# Patient Record
Sex: MALE | Race: WHITE | NOT HISPANIC OR LATINO | Employment: UNEMPLOYED | URBAN - METROPOLITAN AREA
[De-identification: names, ages, dates, MRNs, and addresses within clinical notes are randomized per-mention and may not be internally consistent; named-entity substitution may affect disease eponyms.]

---

## 2024-04-05 ENCOUNTER — HOSPITAL ENCOUNTER (EMERGENCY)
Facility: HOSPITAL | Age: 54
Discharge: HOME | End: 2024-04-06
Attending: EMERGENCY MEDICINE
Payer: MEDICAID

## 2024-04-05 DIAGNOSIS — M72.2 PLANTAR FASCIITIS, RIGHT: Primary | ICD-10-CM

## 2024-04-05 DIAGNOSIS — S99.929A FOOT INJURY: ICD-10-CM

## 2024-04-05 PROCEDURE — 99284 EMERGENCY DEPT VISIT MOD MDM: CPT | Mod: 25

## 2024-04-05 NOTE — Clinical Note
"Ovidio Bryant" Amena was seen and treated in our emergency department on 4/5/2024.  He may return to work on 04/09/2024.       If you have any questions or concerns, please don't hesitate to call.       RN    "

## 2024-04-06 VITALS
OXYGEN SATURATION: 99 % | TEMPERATURE: 98 F | RESPIRATION RATE: 16 BRPM | SYSTOLIC BLOOD PRESSURE: 160 MMHG | WEIGHT: 217 LBS | HEART RATE: 80 BPM | DIASTOLIC BLOOD PRESSURE: 97 MMHG

## 2024-04-06 PROCEDURE — 63600175 PHARM REV CODE 636 W HCPCS: Performed by: EMERGENCY MEDICINE

## 2024-04-06 PROCEDURE — 96372 THER/PROPH/DIAG INJ SC/IM: CPT | Performed by: EMERGENCY MEDICINE

## 2024-04-06 RX ORDER — DICLOFENAC SODIUM 75 MG/1
75 TABLET, DELAYED RELEASE ORAL 2 TIMES DAILY
Qty: 14 TABLET | Refills: 0 | Status: SHIPPED | OUTPATIENT
Start: 2024-04-06

## 2024-04-06 RX ORDER — KETOROLAC TROMETHAMINE 30 MG/ML
15 INJECTION, SOLUTION INTRAMUSCULAR; INTRAVENOUS
Status: COMPLETED | OUTPATIENT
Start: 2024-04-06 | End: 2024-04-06

## 2024-04-06 RX ADMIN — KETOROLAC TROMETHAMINE 15 MG: 30 INJECTION, SOLUTION INTRAMUSCULAR at 01:04

## 2024-04-06 NOTE — ED PROVIDER NOTES
Encounter Date: 4/5/2024       History     Chief Complaint   Patient presents with    Foot Pain     Right foot/heel pain x2 weeks, denies injury     Patient presents emergency department reported right heel pain onset over last 2 weeks states symptoms are worse after rest and improves with ability describes point tenderness at the anterior aspect of the heel plantar surface he denies any known injury to it previous fractures or dislocation to his heel states he did have a arch fracture when he was younger but that healed without difficulty has no fever chills        Review of patient's allergies indicates:  No Known Allergies  No past medical history on file.  No past surgical history on file.  No family history on file.     Review of Systems   Musculoskeletal:  Positive for arthralgias.       Physical Exam     Initial Vitals [04/05/24 2157]   BP Pulse Resp Temp SpO2   (!) 164/107 84 18 98.1 °F (36.7 °C) 96 %      MAP       --         Physical Exam    Constitutional: He appears well-developed and well-nourished. No distress.   HENT:   Head: Normocephalic and atraumatic.   Right Ear: External ear normal.   Left Ear: External ear normal.   Mouth/Throat: Oropharynx is clear and moist.   Cardiovascular:  Normal rate, regular rhythm, S1 normal, S2 normal and intact distal pulses.           Musculoskeletal:         General: Tenderness present. Normal range of motion.      Comments: Point tenderness to the plantar aspect of the right foot at the heel     Neurological: He is alert and oriented to person, place, and time. GCS eye subscore is 4. GCS verbal subscore is 5. GCS motor subscore is 6.   Skin: Skin is warm and dry. Capillary refill takes less than 2 seconds. No rash noted. No erythema.         ED Course   Procedures  Labs Reviewed - No data to display       Imaging Results              X-Ray Foot Complete Right (In process)                      Medications   ketorolac injection 15 mg (has no administration in time  range)     Medical Decision Making  X-ray show no evidence of fracture dislocation is no evidence of heel spur given patient's point tenderness and symptoms this is likely plantar fasciitis will refer patient to orthopedics return to ER for any worsened symptoms or new symptoms    Risk  Prescription drug management.                                      Clinical Impression:  Final diagnoses:  [S99.927B] Foot injury  [M72.2] Plantar fasciitis, right (Primary)          ED Disposition Condition    Discharge Stable          ED Prescriptions       Medication Sig Dispense Start Date End Date Auth. Provider    diclofenac (VOLTAREN) 75 MG EC tablet Take 1 tablet (75 mg total) by mouth 2 (two) times daily. 14 tablet 4/6/2024 -- Jorge Cohn MD          Follow-up Information       Follow up With Specialties Details Why Contact Info    Loy Renee DPM Podiatry Call in 1 day for re-examination of your symptoms [address]  [address]  [place] [address]  [phone]               Jorge Cohn MD  04/06/24 0142

## 2024-04-06 NOTE — FIRST PROVIDER EVALUATION
Medical screening examination initiated.  I have conducted a focused provider triage encounter, findings are as follows:    Brief history of present illness:  Patient presents to ED for concern for foot pain.    Vitals:    04/05/24 2157   BP: (!) 164/107   BP Location: Left arm   Patient Position: Sitting   Pulse: 84   Resp: 18   Temp: 98.1 °F (36.7 °C)   TempSrc: Oral   SpO2: 96%   Weight: 98.4 kg (217 lb)       Pertinent physical exam:  Patient is awake and alert in no acute distress.    Brief workup plan:  X-rays    Preliminary workup initiated; this workup will be continued and followed by the physician or advanced practice provider that is assigned to the patient when roomed.